# Patient Record
Sex: FEMALE | Race: ASIAN | NOT HISPANIC OR LATINO | Employment: UNEMPLOYED | URBAN - METROPOLITAN AREA
[De-identification: names, ages, dates, MRNs, and addresses within clinical notes are randomized per-mention and may not be internally consistent; named-entity substitution may affect disease eponyms.]

---

## 2024-02-25 ENCOUNTER — HOSPITAL ENCOUNTER (EMERGENCY)
Facility: HOSPITAL | Age: 64
Discharge: HOME | End: 2024-02-25
Attending: EMERGENCY MEDICINE | Admitting: EMERGENCY MEDICINE

## 2024-02-25 ENCOUNTER — APPOINTMENT (EMERGENCY)
Dept: RADIOLOGY | Facility: HOSPITAL | Age: 64
End: 2024-02-25

## 2024-02-25 VITALS
DIASTOLIC BLOOD PRESSURE: 84 MMHG | RESPIRATION RATE: 18 BRPM | SYSTOLIC BLOOD PRESSURE: 185 MMHG | HEART RATE: 69 BPM | OXYGEN SATURATION: 100 % | TEMPERATURE: 97.4 F

## 2024-02-25 DIAGNOSIS — S52.532A CLOSED COLLES' FRACTURE OF LEFT RADIUS, INITIAL ENCOUNTER: Primary | ICD-10-CM

## 2024-02-25 PROCEDURE — 73100 X-RAY EXAM OF WRIST: CPT

## 2024-02-25 PROCEDURE — 99283 EMERGENCY DEPT VISIT LOW MDM: CPT

## 2024-02-25 PROCEDURE — 73110 X-RAY EXAM OF WRIST: CPT

## 2024-02-25 PROCEDURE — 96374 THER/PROPH/DIAG INJ IV PUSH: CPT

## 2024-02-25 PROCEDURE — 99284 EMERGENCY DEPT VISIT MOD MDM: CPT | Performed by: EMERGENCY MEDICINE

## 2024-02-25 PROCEDURE — 73090 X-RAY EXAM OF FOREARM: CPT

## 2024-02-25 PROCEDURE — 24655 CLTX RDL HEAD/NCK FX W/MNPJ: CPT | Performed by: EMERGENCY MEDICINE

## 2024-02-25 RX ORDER — LIDOCAINE HYDROCHLORIDE 10 MG/ML
20 INJECTION, SOLUTION EPIDURAL; INFILTRATION; INTRACAUDAL; PERINEURAL ONCE
Status: COMPLETED | OUTPATIENT
Start: 2024-02-25 | End: 2024-02-25

## 2024-02-25 RX ADMIN — MORPHINE SULFATE 2 MG: 2 INJECTION, SOLUTION INTRAMUSCULAR; INTRAVENOUS at 13:57

## 2024-02-25 RX ADMIN — LIDOCAINE HYDROCHLORIDE 20 ML: 10 INJECTION, SOLUTION EPIDURAL; INFILTRATION; INTRACAUDAL; PERINEURAL at 14:18

## 2024-02-25 NOTE — ED PROVIDER NOTES
History  Chief Complaint   Patient presents with    Arm Injury     Patient reports she fell approx 30 min ago and injured left wrist/forearm. Denies head strike.     63-year-old female presenting to the ED with a left wrist injury.  Patient does not speak English but is with son who was able to translate for her.  Son states that patient was walking outside on the driveway when she slipped and fell putting her hand out in front of her.  Following the fall patient was complaining of left wrist pain and was unable to move at the wrist.  Patient did not hit her head and patient did not lose consciousness.  Patient was her normal self up to the fall and the fall was mechanical in nature.  Patient was did not have syncope or dizziness involved with the fall.  Patient denies recent illnesses including fevers and chills, chest pain, palpitations, shortness of breath, difficulty breathing.  Patient's left wrist has obvious deformity with no open wounds.        None       History reviewed. No pertinent past medical history.    History reviewed. No pertinent surgical history.    History reviewed. No pertinent family history.  I have reviewed and agree with the history as documented.    E-Cigarette/Vaping     E-Cigarette/Vaping Substances           Review of Systems   Constitutional:  Negative for chills and fever.   HENT:  Negative for rhinorrhea, sinus pressure and sinus pain.    Eyes:  Negative for visual disturbance.   Respiratory:  Negative for chest tightness and shortness of breath.    Cardiovascular:  Negative for chest pain and palpitations.   Gastrointestinal:  Negative for abdominal pain, diarrhea, nausea and vomiting.   Endocrine: Negative for polyuria.   Genitourinary:  Negative for difficulty urinating.   Musculoskeletal:  Positive for joint swelling. Negative for arthralgias, back pain, myalgias, neck pain and neck stiffness.   Skin:  Negative for pallor and rash.   Neurological:  Negative for dizziness,  seizures, syncope, weakness, light-headedness and headaches.   Psychiatric/Behavioral:  Negative for agitation.        Physical Exam  ED Triage Vitals [02/25/24 1309]   Temperature Pulse Respirations Blood Pressure SpO2   (!) 97.4 °F (36.3 °C) 69 18 (!) 185/84 100 %      Temp Source Heart Rate Source Patient Position - Orthostatic VS BP Location FiO2 (%)   Oral Monitor Sitting Right arm --      Pain Score       7             Orthostatic Vital Signs  Vitals:    02/25/24 1309   BP: (!) 185/84   Pulse: 69   Patient Position - Orthostatic VS: Sitting       Physical Exam  Constitutional:       General: She is in acute distress.   HENT:      Head: Normocephalic and atraumatic.      Right Ear: External ear normal.      Left Ear: External ear normal.      Nose: Nose normal.      Mouth/Throat:      Mouth: Mucous membranes are moist.      Pharynx: Oropharynx is clear.   Eyes:      Extraocular Movements: Extraocular movements intact.      Pupils: Pupils are equal, round, and reactive to light.   Cardiovascular:      Rate and Rhythm: Normal rate.      Pulses: Normal pulses.      Heart sounds: Normal heart sounds.   Pulmonary:      Effort: Pulmonary effort is normal.      Breath sounds: Normal breath sounds.   Abdominal:      General: Bowel sounds are normal.      Palpations: Abdomen is soft.   Musculoskeletal:         General: Swelling and tenderness present.      Left wrist: Swelling, deformity, tenderness, bony tenderness and snuff box tenderness present. Decreased range of motion. Normal pulse.      Cervical back: Normal range of motion.   Skin:     General: Skin is warm.      Capillary Refill: Capillary refill takes less than 2 seconds.   Neurological:      General: No focal deficit present.      Mental Status: She is alert and oriented to person, place, and time.   Psychiatric:         Mood and Affect: Mood normal.         Behavior: Behavior normal.         ED Medications  Medications   morphine injection 2 mg (2 mg  Intravenous Given 2/25/24 1357)   lidocaine (PF) (XYLOCAINE-MPF) 1 % injection 20 mL (20 mL Infiltration Given 2/25/24 1418)       Diagnostic Studies  Results Reviewed       None                   XR wrist 2 vw left   ED Interpretation by Alexis Abernathy DO (02/25 0152)   Improved alignment s/p reduction      XR forearm 2 views LEFT   ED Interpretation by Ever Rivera MD (02/25 1416)   Colles' fracture with displacement      XR wrist 3+ views LEFT   ED Interpretation by Ever Rivera MD (02/25 1416)   Colles' fracture with displacement            Procedures  Orthopedic injury treatment    Date/Time: 2/25/2024 3:15 PM    Performed by: Ever Rivera MD  Authorized by: Ever Rivera MD    Patient Location:  ED  [place] Protocol:  Procedure performed by:  Consent: Verbal consent obtained.  Consent given by: patient  Patient understanding: patient states understanding of the procedure being performed  Patient identity confirmed: verbally with patient and arm band    Injury location:  Forearm  Location details:  Left forearm  Injury type:  Fracture  Fracture type: radial head    Fracture type: radial head    Neurovascular status: Neurovascularly intact    Distal perfusion: normal    Neurological function: normal    Range of motion: normal    Local anesthesia used?: Yes    Anesthesia:  Local infiltration and hematoma block  Local anesthetic:  Lidocaine 1% without epinephrine  Manipulation performed?: Yes    Reduction successful?: Yes    Confirmation: Reduction confirmed by x-ray    Immobilization:  Splint  Splint type:  Short arm splint, static (forearm to hand)  Supplies used:  Cotton padding and plaster  Neurovascular status: Neurovascularly intact    Distal perfusion: normal    Neurological function: normal    Range of motion: normal    Patient tolerance:  Patient tolerated the procedure well with no immediate complications        ED Course                                       Medical Decision  Making  63-year-old female presenting to the ED with left wrist pain after a fall.    Differential includes fracture, strain, sprain, bruise, hematoma.    Imaging: X-ray of left wrist as well as left forearm.  Imaging shows Colles' fracture of the distal wrist with displacement    Will do a hematoma block followed by reduction and splint of the wrist.  Patient will follow-up with orthopedics after discharge.    Patient placed in a plaster short arm splint.  Postreduction x-ray shows successful reduction.  Patient given follow-up information for orthopedic surgery.  Patient discharged at this time.    Amount and/or Complexity of Data Reviewed  Radiology: ordered and independent interpretation performed.    Risk  Prescription drug management.          Disposition  Final diagnoses:   Closed Colles' fracture of left radius, initial encounter     Time reflects when diagnosis was documented in both MDM as applicable and the Disposition within this note       Time User Action Codes Description Comment    2/25/2024  2:28 PM Ever Crouch Add [S52.532A] Closed Colles' fracture of left radius, initial encounter           ED Disposition       ED Disposition   Discharge    Condition   Stable    Date/Time   Sun Feb 25, 2024  3:47 PM    Comment   Wagner Cedillo discharge to home/self care.                   Follow-up Information       Follow up With Specialties Details Why Contact Info Additional Information    [facility]   [address]  [address]  [facility] [id number] [phone] [facility] [address], [address], [place], [address]   [phone]    [facility]   [address]  [address]  [facility] [id number] [phone] [facility], [address], [address], [place], [address], [phone]            There are  no discharge medications for this patient.        PDMP Review       None             ED Provider  Attending physically available and evaluated Wagner Cedillo. I managed the patient along with the ED Attending.    Electronically Signed by           Ever Rivera MD  02/25/24 5091

## 2024-02-25 NOTE — ED NOTES
Pian meds administered at this time via IV established by this RN. Xrs complete bedside. Provider reviewing      Anna Mac RN  02/25/24 9940

## 2024-02-25 NOTE — ED ATTENDING ATTESTATION
2/25/2024  I, Alexis Abernathy DO, saw and evaluated the patient. I have discussed the patient with the resident/non-physician practitioner and agree with the resident's/non-physician practitioner's findings, Plan of Care, and MDM as documented in the resident's/non-physician practitioner's note, except where noted. All available labs and Radiology studies were reviewed.  I was present for key portions of any procedure(s) performed by the resident/non-physician practitioner and I was immediately available to provide assistance.       At this point I agree with the current assessment done in the Emergency Department.  I have conducted an independent evaluation of this patient a history and physical is as follows:    64 yo F coming in for eval of left wrist pain and deformity after tripping and falling while walking in the driveway. No head strike or LOC. C/o pain, given tylenol by son prior to arrival.    On exam, there's a distal left wrist deformity with tenderness to the distal radius and ulna. No open wounds or lacerations. No mid or proximal forearm tenderness or swelling.  NVI distally, motor and sensation grossly intact.    Will perform hematoma block, fracture reduction and splint in sugar tong splint, f/u orthopedics outpatient.    ED Course         Critical Care Time  Procedures

## 2024-02-26 ENCOUNTER — TELEPHONE (OUTPATIENT)
Age: 64
End: 2024-02-26

## 2024-02-26 NOTE — TELEPHONE ENCOUNTER
Caller: Son     Doctor: Hand    Reason for call: Missed call, Spoke to tha and set up an appointment

## 2024-02-26 NOTE — TELEPHONE ENCOUNTER
Patient is being referred to a orthopedics. Please schedule accordingly.    [facility]   [phone]

## 2024-02-27 ENCOUNTER — TELEPHONE (OUTPATIENT)
Dept: OBGYN CLINIC | Facility: CLINIC | Age: 64
End: 2024-02-27

## 2024-02-27 ENCOUNTER — OFFICE VISIT (OUTPATIENT)
Dept: OBGYN CLINIC | Facility: CLINIC | Age: 64
End: 2024-02-27

## 2024-02-27 VITALS — HEIGHT: 62 IN

## 2024-02-27 DIAGNOSIS — S52.572A OTHER CLOSED INTRA-ARTICULAR FRACTURE OF DISTAL END OF LEFT RADIUS, INITIAL ENCOUNTER: Primary | ICD-10-CM

## 2024-02-27 DIAGNOSIS — Z01.818 PREOP TESTING: ICD-10-CM

## 2024-02-27 PROCEDURE — 99204 OFFICE O/P NEW MOD 45 MIN: CPT | Performed by: STUDENT IN AN ORGANIZED HEALTH CARE EDUCATION/TRAINING PROGRAM

## 2024-02-27 RX ORDER — CHLORHEXIDINE GLUCONATE ORAL RINSE 1.2 MG/ML
15 SOLUTION DENTAL ONCE
OUTPATIENT
Start: 2024-02-27 | End: 2024-02-27

## 2024-02-27 NOTE — PROGRESS NOTES
ORTHOPAEDIC HAND, WRIST, AND ELBOW OFFICE  VISIT      ASSESSMENT/PLAN:      Diagnoses and all orders for this visit:    Other closed intra-articular fracture of distal end of left radius, initial encounter  -     Ambulatory Referral to Orthopedic Surgery  -     CT upper extremity wo contrast left; Future  -     Case request operating room: OPEN REDUCTION W/ INTERNAL FIXATION (ORIF) LEFT RADIUS; Standing  -     Case request operating room: OPEN REDUCTION W/ INTERNAL FIXATION (ORIF) LEFT RADIUS    Preop testing  -     Basic metabolic panel; Future  -     CBC and differential; Future  -     EKG 12 lead; Future    Other orders  -     Diet NPO; Sips with meds; Standing  -     Void on call to OR; Standing  -     Insert peripheral IV; Standing  -     ceFAZolin (ANCEF) 2,000 mg in dextrose 5 % 100 mL IVPB  -     Nursing Communication Swab both nares with Povidone-Iodine solution, EXCLUDE if patient has shellfish/Iodine allergy. Routine, day of surgery, on call to OR; Standing  -     Nursing Communication Warmimg Interventions Implemented; Standing  -     Nursing Communication CHG bath, have staff wash entire body (neck down) per pre-op bathing protocol. Routine, evening prior to, and day of surgery.; Standing  -     chlorhexidine (PERIDEX) 0.12 % oral rinse 15 mL          63 y.o. female with left distal radius fx sustained 2/25/24  Official  used during today's visit.  Xrays reviewed with pt today  Treatment options and expected outcomes were discussed.  We discussed that with this type of fracture, it is unstable and we would recommend surgery to prevent displacement as it does go into the joint and initial x-rays have an incongruent radiocarpal joint.   We discussed the use of immobilization as well, but that this is less predictable to use as the bone is at risk for displacing. We would need close monitoring with repeat xrays and immobilization should be continued for 6 weeks.  The patient verbalized  understanding of exam findings and treatment plan.   The patient was given the opportunity to ask questions.  Questions were answered to the patient's satisfaction.  The patient decided to move forward with scheduling ORIF for Thursday. Because she is self pay, they would like to get more information about payment and discuss this as a family. They will call right away if they decide against surgery.   If they desire to proceed, we will get STAT CT for surgical planning as well as preop labs and ECG.      Follow Up:  After surgery   If she decides against surgery follow up in 1 week     To Do Next Visit:  X-rays of the  left  wrist with 20 degree elevated lateral view      Discussions:  Distal Radius Fracture Fixation: Both operative and non operative management of the injury was explained to the patient. With non-displaced or minimally displaced fractures, conservative treatment such as casting or splinting often results in a functional recovery.  Typically, these fractures are immobilized in either a cast or splint depending on the pattern.  Radiographs are typically taken at intervals throughout the fracture healing to ensure that reduction or alignment is not lost.  If the fracture loses its alignment, surgical intervention may be required to stabilize it.  Medical conditions such as diabetes, osteoporosis, vitamin D deficiency, and a history of or exposure to smoking may delay or prevent fracture healing. Options between cast/splint immobilization and surgical treatment were offered and the risks and benefits of both were discussed. The decision was made to undergo surgical fixation. The surgical procedure was explained with a verbal understanding expressed by the patient. Postoperatively, the patient is to begin with occupational therapy to have a removable splint applied. This splint may be removed for showering, bathing, dining, sedative activities (eg watching tv, reading etc.) and daily therapy exercises.  Radiographs are typically taken at intervals throughout the fracture healing ensure maintenance of reduction and alignment.  If the fracture loses its alignment, revision surgery may be required.  Medical conditions such as diabetes, osteoporosis, vitamin D deficiency, and a history of or exposure to smoking may delay or prevent fracture healing.  The risks and benefits of the procedure were explained to the patient, which include, but are not limited to: Bleeding, infection, recurrence, pain, scar, malunion, nonunion, damage to tendons, damage to nerves, and damage to blood vessels, and complications related to anesthesia, failure to give desire result, need for more surgery.  These risks, along with alternative conservative treatment options, and postoperative protocols were voiced back and understood by the patient.  All questions were answered to the patient's satisfaction.  The patient agrees to comply with a standard postoperative protocol, and is willing to proceed.  Education was provided via written and auditory forms.  There were no barriers to learning.  Written handouts regarding wound care, incision and scar care, and general preoperative information was provided to the patient.  Prior to surgery, the patient may be requested to stop all anti-inflammatory medications.  Prophylactic aspirin, Plavix, and Coumadin may be allowed to be continued.  Medications including vitamin E., ginkgo, and fish oil are requested to be stopped approximately one week prior to surgery.  Hypertensive medications and beta blockers, if taken, should be continued.      Reji Obando MD  Attending, Orthopaedic Surgery  Hand, Wrist, and Elbow Surgery  [facility]    ______________________________________________________________________________________________    CHIEF COMPLAINT:  Chief Complaint   Patient presents with   • Left Wrist - Pain       SUBJECTIVE:  Patient is a 63 y.o. RHD female who presents  "today for evaluation and treatment of left wrist fracture. Pt fell onto her wrist 02/25/24 with immediate pain. She went to the ED where xrays revealed a displaced distal radius fracture. Reduction was performed and pt placed into a splint. She states she has swelling but no n/t. Pt states she is still relatively active around the home, working with cooking/cleaning. She lives with her daughter, son and daughter in law.    I have personally reviewed all the relevant PMH, PSH, SH, FH, Medications and allergies      PAST MEDICAL HISTORY:  History reviewed. No pertinent past medical history.    PAST SURGICAL HISTORY:  History reviewed. No pertinent surgical history.    FAMILY HISTORY:  History reviewed. No pertinent family history.    SOCIAL HISTORY:       MEDICATIONS:  No current outpatient medications on file.    ALLERGIES:  No Known Allergies        REVIEW OF SYSTEMS:  Musculoskeletal:        As noted in HPI.   All other systems reviewed and are negative.    VITALS:  There were no vitals filed for this visit.    LABS:  HgA1c: No results found for: \"HGBA1C\"  BMP: No results found for: \"GLUCOSE\", \"CALCIUM\", \"NA\", \"K\", \"CO2\", \"CL\", \"BUN\", \"CREATININE\"    _____________________________________________________  PHYSICAL EXAMINATION:  General: Well developed and well nourished, alert & oriented x 3, appears comfortable  Psychiatric: Normal  HEENT: Normocephalic, Atraumatic Trachea Midline, No torticollis  Pulmonary: No audible wheezing or respiratory distress   Abdomen/GI: Non tender, non distended   Cardiovascular: No pitting edema, 2+ radial pulse   Skin: No masses, erythema, lacerations, fluctation, ulcerations on exposed skin  Neurovascular: Sensation Intact to the Median, Ulnar, Radial Nerve, Motor Intact to the Median, Ulnar, Radial Nerve, and Pulses Intact  Musculoskeletal: Normal, except as noted in detailed exam and in HPI.      MUSCULOSKELETAL EXAMINATION:  LUE:  Pt in well-fitted splint.  Per pt as well as ED " note - no lacerations/abrasions.  FDP/FDS/Extensors intact.  Pt able to cross fingers.  SILT throughout the exposed fingers.  Mild edema.  Brisk capillary refill.   Per ER note and patient report, skin intact without abrasions or bleeding    ___________________________________________________  STUDIES REVIEWED:  Xrays of the left wrist were reviewed and independently interpreted in PACS by Dr. Obando and demonstrate displaced intraarticular fracture with step off seen at the articular surface. Post reduction xrays do demonstrate improved alignment.          PROCEDURES PERFORMED:  Procedures  No Procedures performed today    _____________________________________________________      Scribe Attestation    I,:  Hannah Barahona PA-C am acting as a scribe while in the presence of the attending physician.:       I,:  Reji Obando MD personally performed the services described in this documentation    as scribed in my presence.:

## 2024-02-28 ENCOUNTER — ANESTHESIA EVENT (OUTPATIENT)
Dept: ANESTHESIOLOGY | Facility: HOSPITAL | Age: 64
End: 2024-02-28

## 2024-02-28 ENCOUNTER — TELEPHONE (OUTPATIENT)
Dept: OBGYN CLINIC | Facility: CLINIC | Age: 64
End: 2024-02-28

## 2024-02-28 ENCOUNTER — ANESTHESIA (OUTPATIENT)
Dept: ANESTHESIOLOGY | Facility: HOSPITAL | Age: 64
End: 2024-02-28

## 2024-03-07 ENCOUNTER — OFFICE VISIT (OUTPATIENT)
Dept: OBGYN CLINIC | Facility: CLINIC | Age: 64
End: 2024-03-07

## 2024-03-07 ENCOUNTER — HOSPITAL ENCOUNTER (OUTPATIENT)
Dept: RADIOLOGY | Facility: HOSPITAL | Age: 64
End: 2024-03-07
Attending: STUDENT IN AN ORGANIZED HEALTH CARE EDUCATION/TRAINING PROGRAM

## 2024-03-07 VITALS — HEIGHT: 62 IN

## 2024-03-07 DIAGNOSIS — S52.572A OTHER CLOSED INTRA-ARTICULAR FRACTURE OF DISTAL END OF LEFT RADIUS, INITIAL ENCOUNTER: ICD-10-CM

## 2024-03-07 DIAGNOSIS — S52.572A OTHER CLOSED INTRA-ARTICULAR FRACTURE OF DISTAL END OF LEFT RADIUS, INITIAL ENCOUNTER: Primary | ICD-10-CM

## 2024-03-07 PROCEDURE — 99213 OFFICE O/P EST LOW 20 MIN: CPT | Performed by: STUDENT IN AN ORGANIZED HEALTH CARE EDUCATION/TRAINING PROGRAM

## 2024-03-07 PROCEDURE — 73110 X-RAY EXAM OF WRIST: CPT

## 2024-03-07 NOTE — PROGRESS NOTES
ORTHOPAEDIC HAND, WRIST, AND ELBOW OFFICE  VISIT      ASSESSMENT/PLAN:      Diagnoses and all orders for this visit:    Other closed intra-articular fracture of distal end of left radius, initial encounter  -     XR wrist 3+ vw left; Future          63 y.o. female with left distal radius fx sustained 2/25/24     Official translation was used at today's appointment. X-rays were reviewed in the office today which are unchanged from previous films. I discussed with the patient that this fracture is still unstable. As of right now, I do find it reasonable to treat non operatively however, if the fracture displaces I may still recommend surgical intervention. The patient is not interested in surgical intervention at this time. I explained, I do not want to remove the splint as this is holding the reduction. New padding was applied to the splint today. She will follow up on 3/18/24 or 3/19/24 for repeat evaluation and repeat x-rays out of the splint.       Follow Up:  On 3/18/24 or 3/19/23      To Do Next Visit:  Splint off and X-rays of the  left  wrist with 20 degree elevated lateral view             Reji Obando MD  Attending, Orthopaedic Surgery  Hand, Wrist, and Elbow Surgery  [facility]    ______________________________________________________________________________________________    CHIEF COMPLAINT:  Chief Complaint   Patient presents with    Left Wrist - Follow-up       SUBJECTIVE:  Patient is a 63 y.o. RHD female who presents today for follow up of left distal radius fx sustained 2/25/24. The patient was seen in the office on 2/27/24 and we had a discussion about surgical intervention. The patient elected to proceed with surgical intervention however, canceled. The patient was seen by another Orthopedic on the 2/28/24 due to increased pain who rewrapped the splint and told them she could proceed with non operative versus surgical intervention. The patient does not wish to proceed  "with surgical intervention. She states the wrist is feeling a little better.      I have personally reviewed all the relevant PMH, PSH, SH, FH, Medications and allergies      PAST MEDICAL HISTORY:  History reviewed. No pertinent past medical history.    PAST SURGICAL HISTORY:  History reviewed. No pertinent surgical history.    FAMILY HISTORY:  History reviewed. No pertinent family history.    SOCIAL HISTORY:       MEDICATIONS:  No current outpatient medications on file.    ALLERGIES:  No Known Allergies        REVIEW OF SYSTEMS:  Musculoskeletal:        As noted in HPI.   All other systems reviewed and are negative.    VITALS:  There were no vitals filed for this visit.    LABS:  HgA1c: No results found for: \"HGBA1C\"  BMP: No results found for: \"GLUCOSE\", \"CALCIUM\", \"NA\", \"K\", \"CO2\", \"CL\", \"BUN\", \"CREATININE\"    _____________________________________________________  PHYSICAL EXAMINATION:  General: Well developed and well nourished, alert & oriented x 3, appears comfortable  Psychiatric: Normal  HEENT: Normocephalic, Atraumatic Trachea Midline, No torticollis  Pulmonary: No audible wheezing or respiratory distress   Abdomen/GI: Non tender, non distended   Cardiovascular: No pitting edema, 2+ radial pulse   Skin: No masses, erythema, lacerations, fluctation, ulcerations  Neurovascular: Sensation Intact to the Median, Ulnar, Radial Nerve, Motor Intact to the Median, Ulnar, Radial Nerve, and Pulses Intact  Musculoskeletal: Normal, except as noted in detailed exam and in HPI.      MUSCULOSKELETAL EXAMINATION:  Left wrist   Pulp to palm distance 4 cm  Skin examination deferred secondary to splint holding reduction   ___________________________________________________  STUDIES REVIEWED:  Xrays of the left wrist were reviewed and independently interpreted in PACS by Dr. Obando and demonstrate left distal radius fracture unchanged from previous films.           PROCEDURES PERFORMED:  Procedures  No Procedures performed " today    _____________________________________________________      Scribe Attestation      I,:  Maddie Fernandez MA am acting as a scribe while in the presence of the attending physician.:       I,:  Reji Obando MD personally performed the services described in this documentation    as scribed in my presence.:

## 2024-04-05 ENCOUNTER — OFFICE VISIT (OUTPATIENT)
Dept: OBGYN CLINIC | Facility: CLINIC | Age: 64
End: 2024-04-05
Payer: COMMERCIAL

## 2024-04-05 ENCOUNTER — HOSPITAL ENCOUNTER (OUTPATIENT)
Dept: RADIOLOGY | Facility: HOSPITAL | Age: 64
End: 2024-04-05
Attending: STUDENT IN AN ORGANIZED HEALTH CARE EDUCATION/TRAINING PROGRAM
Payer: COMMERCIAL

## 2024-04-05 VITALS — HEIGHT: 62 IN

## 2024-04-05 DIAGNOSIS — S52.572A OTHER CLOSED INTRA-ARTICULAR FRACTURE OF DISTAL END OF LEFT RADIUS, INITIAL ENCOUNTER: ICD-10-CM

## 2024-04-05 DIAGNOSIS — S52.572A OTHER CLOSED INTRA-ARTICULAR FRACTURE OF DISTAL END OF LEFT RADIUS, INITIAL ENCOUNTER: Primary | ICD-10-CM

## 2024-04-05 PROCEDURE — 73110 X-RAY EXAM OF WRIST: CPT

## 2024-04-05 PROCEDURE — 99213 OFFICE O/P EST LOW 20 MIN: CPT | Performed by: STUDENT IN AN ORGANIZED HEALTH CARE EDUCATION/TRAINING PROGRAM

## 2024-04-05 NOTE — PROGRESS NOTES
ORTHOPAEDIC HAND, WRIST, AND ELBOW OFFICE  VISIT      ASSESSMENT/PLAN:      Diagnoses and all orders for this visit:    Other closed intra-articular fracture of distal end of left radius, initial encounter  -     XR wrist 3+ vw left; Future  -     Ambulatory Referral to PT/OT Hand Therapy; Future          64 y.o. female with left distal radius fx sustained 2/25/24.  Xrays reviewed with patient today.  Thankfully, it appears that this fracture has healed and maintained its position.  At this point, pt can remain out of the splint.  Was provided name for cock up wrist splint to use for the next 2 weeks as she transitions out of this.  We did advise she go to therapy as well as she has significant stiffness on exam today in both fingers and wrist.       Follow Up:  6 weeks       To Do Next Visit:  X-rays of the  left  wrist with 20 degree elevated lateral view        Reji Obando MD  Attending, Orthopaedic Surgery  Hand, Wrist, and Elbow Surgery  [facility]    ______________________________________________________________________________________________    CHIEF COMPLAINT:  Chief Complaint   Patient presents with   • Left Wrist - Follow-up       SUBJECTIVE:  Patient is a 64 y.o. RHD female who presents today for follow up of left distal radius fx sustained 2/25/24.While patient was initially signed up for ORIF, she ultimately decided against this. She was to follow up on 3/18, but no-showed this appointment. Official  was offered today, but politely declined by patient and family.    I have personally reviewed all the relevant PMH, PSH, SH, FH, Medications and allergies      PAST MEDICAL HISTORY:  History reviewed. No pertinent past medical history.    PAST SURGICAL HISTORY:  History reviewed. No pertinent surgical history.    FAMILY HISTORY:  History reviewed. No pertinent family history.    SOCIAL HISTORY:  Social History     Tobacco Use   • Smoking status: Unknown  "      MEDICATIONS:  No current outpatient medications on file.    ALLERGIES:  No Known Allergies        REVIEW OF SYSTEMS:  Musculoskeletal:        As noted in HPI.   All other systems reviewed and are negative.    VITALS:  There were no vitals filed for this visit.    LABS:  HgA1c: No results found for: \"HGBA1C\"  BMP: No results found for: \"GLUCOSE\", \"CALCIUM\", \"NA\", \"K\", \"CO2\", \"CL\", \"BUN\", \"CREATININE\"    _____________________________________________________  PHYSICAL EXAMINATION:  General: Well developed and well nourished, alert & oriented x 3, appears comfortable  Psychiatric: Normal  HEENT: Normocephalic, Atraumatic Trachea Midline, No torticollis  Pulmonary: No audible wheezing or respiratory distress   Abdomen/GI: Non tender, non distended   Cardiovascular: No pitting edema, 2+ radial pulse   Skin: No masses, erythema, lacerations, fluctation, ulcerations  Neurovascular: Sensation Intact to the Median, Ulnar, Radial Nerve, Motor Intact to the Median, Ulnar, Radial Nerve, and Pulses Intact  Musculoskeletal: Normal, except as noted in detailed exam and in HPI.      MUSCULOSKELETAL EXAMINATION:  Left Wrist:  Pulp to palm 6cm.  No open wounds.  Elbow 10 shy full extension.  Wrist flexion 45 degrees.  Wrist extension 35 degrees shy of full extension.   ___________________________________________________  STUDIES REVIEWED:  Xrays of the left wrist were reviewed and independently interpreted in PACS by Dr. Obando and demonstrate maintained fracture alignment compared to previous films. There does appear to be signs of healing taking place as well.           PROCEDURES PERFORMED:  Procedures  No Procedures performed today    _____________________________________________________      Scribe Attestation    I,:  Hannah Barahona PA-C am acting as a scribe while in the presence of the attending physician.:       I,:  Reji Obando MD personally performed the services described in this documentation    as scribed " in my presence.:

## 2024-04-10 ENCOUNTER — EVALUATION (OUTPATIENT)
Dept: OCCUPATIONAL THERAPY | Age: 64
End: 2024-04-10
Payer: COMMERCIAL

## 2024-04-10 DIAGNOSIS — S52.572A OTHER CLOSED INTRA-ARTICULAR FRACTURE OF DISTAL END OF LEFT RADIUS, INITIAL ENCOUNTER: Primary | ICD-10-CM

## 2024-04-10 PROCEDURE — 97110 THERAPEUTIC EXERCISES: CPT

## 2024-04-10 PROCEDURE — 97166 OT EVAL MOD COMPLEX 45 MIN: CPT

## 2024-04-10 NOTE — PROGRESS NOTES
OT Evaluation     Today's date: 4/10/2024  Patient name: Wagner Cedillo  : 1960  MRN: [id number]  Referring provider: Hannah Barahona PA-C  Dx:   Encounter Diagnosis     ICD-10-CM    1. Other closed intra-articular fracture of distal end of left radius, initial encounter  S52.572A                      Assessment  Assessment details: Patient is a 63 y.o. RHD female who presents to OPOT/Hand Therapy for evaluation of L wrist s/p left distal radius fx sustained on 24.  She presents to therapy with her daughter in law in a prefabricated wrist splint and increase edema in L hand and wrist. Patient lives with son and daughter in law and assisted in cooking and cleaning independently prior to injury.  Patient is very guarded with use of LUE and daughter in law reports patient wanting to wear wrist splint at all times.  Recommended patient to wean off the splint during the day as well as increase gross use of LUE with functional daily activities.  Pt. presents with increase pain with activity, decrease AROM, edema, and strength.   Issued elbow, forearm, wrist, and digit AROM exercises and educated on edema mobilization. Patient will strongly benefit from skilled OT services to address impairments noted above to enable functional use of LUE with ADL/IADLs.     Impairments: abnormal or restricted ROM, activity intolerance, impaired physical strength, lacks appropriate home exercise program, pain with function and weight-bearing intolerance    Symptom irritability: moderateUnderstanding of Dx/Px/POC: good   Prognosis: good    Goals  STGs.   1.  Patient will be independent with HEP of recommended AROM and stretching in 1-2 wks.  2.  Patient to decrease subjective pain in L hand with daily activities to 2/10 in 4 wks.  3. Decrease edema in L hand by 50% in 4 wks.   4.  Increase wrist AROM by 20 degrees in 4 wks.  5.  Patient to have full composite fist in 4 wks.      LTGs to be achieved at time of discharge  1.   Patient will report 0/10 pain with normal roles/routines.  2.  Patient will report improved ability to complete ADLs/IADLs to prior level of function with LUE.   3.  Patient will demonstrate improved  and pinch strength in LUE to within 25% of the uninvolved side for improved participation in ADLs/IADLs.   4.  Improve FOTO score to predicted outcome or greater.  5. Performance in ADLS/IADLs will be improved to prior level of function with the LUE.    Plan  Patient would benefit from: skilled occupational therapy  Planned modality interventions: thermotherapy: hydrocollator packs and cryotherapy  Planned therapy interventions: IASTM, manual therapy, massage, neuromuscular re-education, patient education, strengthening, stretching, therapeutic activities, functional ROM exercises, home exercise program, therapeutic exercise, therapeutic training, graded activity and graded exercise  Frequency: 2x week  Duration in visits: 16  Plan of Care beginning date: 4/10/2024  Treatment plan discussed with: patient and family (daughter in law)        Subjective Evaluation    History of Present Illness  Date of onset: 2/25/2024  Mechanism of injury: trauma  Mechanism of injury: She fell outside onto her left hand on 2/25/24.  The patient was seen by hand surgeon on 2/27/24 and had a discussion about surgical intervention. The patient elected to proceed with surgical intervention however, canceled. The patient was seen by another Orthopedic on the 2/28/24 due to increased pain who rewrapped the splint and told them she could proceed with non operative versus surgical intervention. The patient wished to not proceed with surgical intervention. Splint removed on 4/5/24 and patient to continue with prefabricated wrist splint for ~2 wks and wean off as per MD.  Quality of life: fair    Patient Goals  Patient goals for therapy: decreased edema, decreased pain, increased motion, independence with ADLs/IADLs and increased  "strength  Patient goal: \"to have no pain and move my arm\"  Pain  Current pain ratin  At best pain ratin  At worst pain ratin  Location: left wrist  Quality: throbbing, dull ache and discomfort  Relieving factors: medications  Exacerbated by: any activity with L hand/wrist.    Social Support  Steps to enter house: yes  Stairs in house: yes   Lives in: multiple-level home  Lives with: adult children    Employment status: not working  Hand dominance: right      Diagnostic Tests  X-ray: abnormal (left  wrist with 20 degree elevated lateral view)  Treatments  Previous treatment: immobilization  Current treatment: occupational therapy        Objective     Observations     Left Wrist/Hand   Positive for edema.     Neurological Testing     Sensation     Wrist/Hand   Left   Intact: light touch    Right   Intact: light touch    Active Range of Motion     Left Elbow   Flexion: WFL  Extension: -10 degrees   Forearm supination: 32 degrees   Forearm pronation: 86 degrees     Right Elbow   Normal active range of motion    Left Wrist   Wrist flexion: 40 degrees with pain  Wrist extension: 13 degrees with pain  Radial deviation: 5 degrees with pain  Ulnar deviation: 8 degrees with pain      Right Wrist   Normal active range of motion    Left Thumb   Flexion     MP: 20 degrees    DIP: 18 degrees  Palmar Abduction     CMC: 40 degrees  Radial abduction    CMC: 50 degrees    Opposition: -Patient can only oppose to pad of index finger      Right Thumb   Opposition: WNL    Left Digits    Flexion   Index     MCP: 42    PIP: 46    DIP: 20  Middle     MCP: 42    PIP: 50    DIP: 18  Ring     MCP: 42    PIP: 42    DIP: 14  Little     MCP: 32    PIP: 46    DIP: 6  Index: 108 degrees  Middle: 110 degrees  Rin degrees  Small: 84 degrees    Additional Active Range of Motion Details  -Composite fist: 7.5cm from pad of IF to DPC / 6.5cm from pad of LF to DPC    Passive Range of Motion     Left Elbow   Flexion: WFL  Extension: -5 " degrees   Forearm supination: 50 degrees   Forearm pronation: WFL    Left Wrist   Wrist flexion: 48 degrees   Wrist extension: 28 degrees     Strength/Myotome Testing     Left Elbow   Flexion: 3-  Extension: 3-  Forearm supination: 3-  Forearm pronation: 3-    Right Elbow   Normal strength    Right Wrist/Hand   Normal wrist strength     (2nd hand position)     Trial 1: 39.7    Thumb Strength   Key/Lateral Pinch     Trial 1: 9  Tip/Two-Point Pinch     Trial 1: 7  Palmar/Three-Point Pinch     Trial 1: 10    Swelling     Left Wrist/Hand   Thumb     Proximal: 7 cm  Index     Proximal: 7 cm  Middle     Proximal: 7 cm  Ring     Proximal: 6.5 cm  Little     Proximal: 6.5 cm  Circumference MCP: 19.5 cm  Circumference wrist: 18.5 cm    Right Wrist/Hand   Thumb     Proximal: 6.25 cm  Index     Proximal: 6 cm  Middle     Proximal: 6 cm  Ring     Proximal: 5.5 cm  Little     Proximal: 5 cm  Circumference MCP: 19 cm  Circumference wrist: 16 cm             Precautions: closed intra-articular fracture of distal end of left radius on 2/25/24      Manuals 4/10            STM             Edema Mgmt                                       Neuro Re-Ed                                                                                           HEP AROM of L elbow, forearm, wrist, digits/thumb            Ther Ex             AROM L elbow  10x            AROM FA 10x            AROM wrist 10x            Digits  Flex/ext/abd/add 10x (AAROM)            Thumb/opposition 10x (AAROM)                                                   Ther Activity             Wrist Maze                                                                 Modalities             MHP

## 2024-04-12 NOTE — PROGRESS NOTES
Daily Note     Today's date: 4/15/2024  Patient name: Wagner Cedillo  : 1960  MRN: [id number]  Referring provider: Hannah Barahona PA-C  Dx:   Encounter Diagnosis     ICD-10-CM    1. Other closed intra-articular fracture of distal end of left radius, initial encounter  S52.572A           Start Time: 1030  Stop Time: 1120  Total time in clinic (min): 50 minutes    Subjective: Pt denies new complaints      Objective: See treatment diary below      Assessment: Tolerated treatment well. Pt has significant wrist and digit stiffness. She has extrinsic stiffness that is the primary limiting factor for her digit ROM. Educated patient and her daughter on low-intensity longer duration stretching. Reviewed AROM exercises for forearm, wrist, fingers, thumb. Patient would benefit from continued OT      Plan: Continue per plan of care.  Progress treatment as tolerated.       Precautions: closed intra-articular fracture of distal end of left radius on 24      Manuals 4/10 4/15           STM  5           Edema Mgmt  5                                     Neuro Re-Ed                                                                                           HEP AROM of L elbow, forearm, wrist, digits/thumb            Ther Ex             AROM L elbow  10x            AROM FA 10x            AROM wrist 10x x20           Digits  Flex/ext/abd/add 10x (AAROM) PROM 10m  X20 AROM           Thumb/opposition 10x (AAROM)            Wrist PROM  5m           Thumb PROM  5m                        Ther Activity             Wrist Maze                                                                 Modalities             MHP  5m

## 2024-04-15 ENCOUNTER — OFFICE VISIT (OUTPATIENT)
Dept: OCCUPATIONAL THERAPY | Age: 64
End: 2024-04-15
Payer: COMMERCIAL

## 2024-04-15 DIAGNOSIS — S52.572A OTHER CLOSED INTRA-ARTICULAR FRACTURE OF DISTAL END OF LEFT RADIUS, INITIAL ENCOUNTER: Primary | ICD-10-CM

## 2024-04-15 PROCEDURE — 97140 MANUAL THERAPY 1/> REGIONS: CPT

## 2024-04-15 PROCEDURE — 97110 THERAPEUTIC EXERCISES: CPT

## 2024-04-17 ENCOUNTER — OFFICE VISIT (OUTPATIENT)
Dept: OCCUPATIONAL THERAPY | Age: 64
End: 2024-04-17
Payer: COMMERCIAL

## 2024-04-17 DIAGNOSIS — S52.572A OTHER CLOSED INTRA-ARTICULAR FRACTURE OF DISTAL END OF LEFT RADIUS, INITIAL ENCOUNTER: Primary | ICD-10-CM

## 2024-04-17 PROCEDURE — 97110 THERAPEUTIC EXERCISES: CPT

## 2024-04-17 PROCEDURE — 97140 MANUAL THERAPY 1/> REGIONS: CPT

## 2024-04-17 NOTE — PROGRESS NOTES
Daily Note     Today's date: 2024  Patient name: Wagner Cedillo  : 1960  MRN: [id number]  Referring provider: Hannah Barahona PA-C  Dx:   Encounter Diagnosis     ICD-10-CM    1. Other closed intra-articular fracture of distal end of left radius, initial encounter  S52.572A           Start Time: 1010  Stop Time: 1055  Total time in clinic (min): 45 minutes    Subjective: Pt states she has been doing her exercises      Objective: See treatment diary below      Assessment: Tolerated treatment well. Pt and her daughter report she has been diligent about her finger AROM exercises but isn't doing many wrist stretches. Reviewed importance of doing her HEPs regularly. Issued edema glove and tubigrip for edema management. Patient would benefit from continued OT      Plan: Continue per plan of care.  Progress treatment as tolerated.       Precautions: closed intra-articular fracture of distal end of left radius on 24      Manuals 4/10 4/15 4/17          STM  5 5          Edema Mgmt  5 5                                    Neuro Re-Ed                                                                                           HEP AROM of L elbow, forearm, wrist, digits/thumb  AROM X20 each          Ther Ex             AROM L elbow  10x            AROM FA 10x            AROM wrist 10x x20           Digits  Flex/ext/abd/add 10x (AAROM) PROM 10m  X20 AROM PROM 10m x20 AROM          Thumb/opposition 10x (AAROM)  x20          Wrist PROM  5m 5m          Thumb PROM  5m 5m                       Ther Activity             Wrist Maze                                                                 Modalities             MHP  5m 5m in supination stretch                           Sensation and skin integrity assessed prior to, during, and following the application of moist heat pack. All assessments found sensation and integrity to be intact. Pt instructed to inform clinician if use of the modality became uncomfortable and/or  too intense to tolerate at any time.

## 2024-04-18 ENCOUNTER — APPOINTMENT (OUTPATIENT)
Dept: OCCUPATIONAL THERAPY | Age: 64
End: 2024-04-18
Payer: COMMERCIAL

## 2024-04-29 ENCOUNTER — APPOINTMENT (OUTPATIENT)
Dept: OCCUPATIONAL THERAPY | Age: 64
End: 2024-04-29
Payer: COMMERCIAL

## 2024-04-29 DIAGNOSIS — S52.572A OTHER CLOSED INTRA-ARTICULAR FRACTURE OF DISTAL END OF LEFT RADIUS, INITIAL ENCOUNTER: Primary | ICD-10-CM

## 2024-04-29 NOTE — PROGRESS NOTES
Daily Note     Today's date: 2024  Patient name: Wagner Cedillo  : 1960  MRN: [id number]  Referring provider: Hannah Barahona PA-C  Dx:   Encounter Diagnosis     ICD-10-CM    1. Other closed intra-articular fracture of distal end of left radius, initial encounter  S52.572A                      Subjective: ***      Objective: See treatment diary below      Assessment: Tolerated treatment well. ***. Patient would benefit from continued OT      Plan: Continue per plan of care.  Progress treatment as tolerated.       Precautions: closed intra-articular fracture of distal end of left radius on 24      Manuals 4/10 4/15 4/17 4/29         STM  5 5          Edema Mgmt  5 5                                    Neuro Re-Ed                                                                                           HEP AROM of L elbow, forearm, wrist, digits/thumb  AROM X20 each          Ther Ex             AROM L elbow  10x            AROM FA 10x            AROM wrist 10x x20           Digits  Flex/ext/abd/add 10x (AAROM) PROM 10m  X20 AROM PROM 10m x20 AROM          Thumb/opposition 10x (AAROM)  x20          Wrist PROM  5m 5m          Thumb PROM  5m 5m                       Ther Activity             Wrist Maze                                                                 Modalities             MHP  5m 5m in supination stretch                           Sensation and skin integrity assessed prior to, during, and following the application of moist heat pack. All assessments found sensation and integrity to be intact. Pt instructed to inform clinician if use of the modality became uncomfortable and/or too intense to tolerate at any time.